# Patient Record
(demographics unavailable — no encounter records)

---

## 2025-01-14 NOTE — HISTORY OF PRESENT ILLNESS
[FreeTextEntry1] : Follow up  [de-identified] : 47 yo F presenting for f/u of A1c. Pt is new to provider today and following with Dr. Franco as PCP.  A1c in preDM range started on metformin 500mg QD by PCP Has been out of meds for 2 weeks now When taking metformin noted initlaly some GI upset that has now subsided  Diet: limits carbs  Exercise: Started exercise regimen